# Patient Record
(demographics unavailable — no encounter records)

---

## 2024-10-25 NOTE — PHYSICAL EXAM
[No Acute Distress] : no acute distress [No Respiratory Distress] : no respiratory distress  [No Accessory Muscle Use] : no accessory muscle use [Clear to Auscultation] : lungs were clear to auscultation bilaterally [Normal Rate] : normal rate  [Regular Rhythm] : with a regular rhythm [Normal S1, S2] : normal S1 and S2 [No Carotid Bruits] : no carotid bruits [Soft] : abdomen soft [Non Tender] : non-tender [No Joint Swelling] : no joint swelling [No Rash] : no rash [Coordination Grossly Intact] : coordination grossly intact [Normal Affect] : the affect was normal [Normal Insight/Judgement] : insight and judgment were intact

## 2024-10-25 NOTE — HISTORY OF PRESENT ILLNESS
[FreeTextEntry1] : PReP follow up PHQ: 0 [de-identified] : 39 year old male presenting for apretude injection and to discuss a few issues:  -Peripheral vertigo - having sensation of room spinning for the past few months. Occurs with standing or with moving side to side. Sensation lasts a few minutes and goes away on its own. No inciting trigger initially. Grandmother has severe vertigo. -Headaches - worsening in the past few months. Happens about once every two weeks. Associated with photophobia and now occasionally nausea. Takes ibuprofen at the beginning of the pain to resolve it. If unable to take ibuprofen, then only sleep resolves it.  -Diarrhea after food from a [place] about one week ago. Resolving and now only one loose stool per day. No associated fever.  -MSM, sexually active, wants STD screening today

## 2024-10-25 NOTE — REVIEW OF SYSTEMS
[Diarrhea] : diarrhea [Headache] : headache [Fever] : no fever [Chest Pain] : no chest pain [Palpitations] : no palpitations [Claudication] : no  leg claudication [Shortness Of Breath] : no shortness of breath [Abdominal Pain] : no abdominal pain [Vomiting] : no vomiting [Dysuria] : no dysuria [de-identified] : Occasional vertigo

## 2024-12-13 NOTE — HEALTH RISK ASSESSMENT
[0] : 2) Feeling down, depressed, or hopeless: Not at all (0) [PHQ-2 Negative - No further assessment needed] : PHQ-2 Negative - No further assessment needed [[id number]] : 0

## 2024-12-13 NOTE — HISTORY OF PRESENT ILLNESS
[FreeTextEntry1] : PReP f/u PHQ2:0 [de-identified] : # PrEP - here for apretude, 7w0d since last visit - taking doxypep correctly prn - requests asymptomatic bacterial STI testing - mild pain at injection site but tolerable - no s/s acute HIV  # Vertigo - at last visit thought to have BPPV, referred to neuro given this and worsening headaches - got better since last visit - tends to happen after looking/bending down then on standing  - has neuro referral but hasn't had time to schedule yet  - headaches now resolved  - hasn't been drinking much water; used to not drink much at all and now improving  - 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

## 2024-12-13 NOTE — REVIEW OF SYSTEMS
[Fever] : no fever [Chills] : no chills [Recent Change In Weight] : ~T no recent weight change [Sore Throat] : no sore throat [Chest Pain] : no chest pain [Shortness Of Breath] : no shortness of breath [Cough] : no cough [Abdominal Pain] : no abdominal pain [Nausea] : no nausea [Constipation] : no constipation [Diarrhea] : no diarrhea [Vomiting] : no vomiting [Headache] : no headache [Fainting] : no fainting

## 2024-12-13 NOTE — PHYSICAL EXAM
[No Acute Distress] : no acute distress [Well-Appearing] : well-appearing [Normal Sclera/Conjunctiva] : normal sclera/conjunctiva [PERRL] : pupils equal round and reactive to light [EOMI] : extraocular movements intact [Normal Outer Ear/Nose] : the outer ears and nose were normal in appearance [Normal Oropharynx] : the oropharynx was normal [No Lymphadenopathy] : no lymphadenopathy [Supple] : supple [No Respiratory Distress] : no respiratory distress  [No Accessory Muscle Use] : no accessory muscle use [Clear to Auscultation] : lungs were clear to auscultation bilaterally [Normal Rate] : normal rate  [Regular Rhythm] : with a regular rhythm [Normal S1, S2] : normal S1 and S2 [No Murmur] : no murmur heard [Normal Posterior Cervical Nodes] : no posterior cervical lymphadenopathy [Normal Anterior Cervical Nodes] : no anterior cervical lymphadenopathy [No Focal Deficits] : no focal deficits [Normal Affect] : the affect was normal [Normal Insight/Judgement] : insight and judgment were intact [de-identified] : CN II-XII intact. [place] neg b/l

## 2025-02-10 NOTE — HISTORY OF PRESENT ILLNESS
[FreeTextEntry1] : Prep follow up PHQ-2(0PT) [de-identified] : # PrEP - here for apretude, 8w3d since last shot  - one new partner since last visit, oral only, used doxypep 2 pills right after  - no s/s acute HIV   # Lightheadedness - still periodic symptoms when turning head to either side  - has been improving PO hydration, symptoms no longer happening when standing - no syncope - BP checked periodically at work mostly c/w today's value   # Dental - got gum surgery in [place] since last visit  - issue was resolved, will be following up with them   # Acne - symptoms well controlled w topical creams  - using steroid cream sparingly on scalp  # Testicular Calcification - no symptoms - since last visit scheduled  f/u, will see them later this week

## 2025-02-10 NOTE — PHYSICAL EXAM
[No Acute Distress] : no acute distress [Well-Appearing] : well-appearing [Normal Outer Ear/Nose] : the outer ears and nose were normal in appearance [Normal Oropharynx] : the oropharynx was normal [No Lymphadenopathy] : no lymphadenopathy [Supple] : supple [No Respiratory Distress] : no respiratory distress  [No Accessory Muscle Use] : no accessory muscle use [Clear to Auscultation] : lungs were clear to auscultation bilaterally [Normal Rate] : normal rate  [Regular Rhythm] : with a regular rhythm [Normal S1, S2] : normal S1 and S2 [No Murmur] : no murmur heard [Soft] : abdomen soft [Non Tender] : non-tender [Non-distended] : non-distended [Normal Posterior Cervical Nodes] : no posterior cervical lymphadenopathy [Normal Anterior Cervical Nodes] : no anterior cervical lymphadenopathy [No Focal Deficits] : no focal deficits [Normal Affect] : the affect was normal [Normal Insight/Judgement] : insight and judgment were intact [de-identified] : lightheadedness not elicited w neck palpation

## 2025-02-10 NOTE — HEALTH RISK ASSESSMENT
[0] : 2) Feeling down, depressed, or hopeless: Not at all (0) [PHQ-2 Negative - No further assessment needed] : PHQ-2 Negative - No further assessment needed [Never] : Never [[id number]] : 0

## 2025-02-10 NOTE — REVIEW OF SYSTEMS
[Dizziness] : dizziness [Fever] : no fever [Chills] : no chills [Sore Throat] : no sore throat [Chest Pain] : no chest pain [Palpitations] : no palpitations [Lower Ext Edema] : no lower extremity edema [Shortness Of Breath] : no shortness of breath [Cough] : no cough [Abdominal Pain] : no abdominal pain [Nausea] : no nausea [Constipation] : no constipation [Diarrhea] : no diarrhea [Vomiting] : no vomiting [Melena] : no melena [Dysuria] : no dysuria [Hematuria] : no hematuria [Skin Rash] : no skin rash [Headache] : no headache [Fainting] : no fainting [FreeTextEntry4] : no swollen glands

## 2025-02-24 NOTE — HISTORY OF PRESENT ILLNESS
[de-identified] : 40 yo MARIAJOSE comes in for evaluation of left shoulder pain that started a month ago without injury.  His biggest complaint is the pain when dressing. He is ok with flexion but feels it more with abduction. Pain has been dull and mild, 1/10. He has not needed any pain medications.  No history of shoulder issues prior.  He typically does light weight workouts.  He has not ever had any injections or done physical therapy for the shoulder.  He does have a history of labral tear in the hip and pain is somewhat reminiscent of that

## 2025-02-24 NOTE — ASSESSMENT
[FreeTextEntry1] : 38 yo LHD male with left shoulder pain over the past month without any specific injury.  He has good motion and good strength but there is some pain.  He may have some bursitis or tendinopathy.  Labral tear certainly can occur but he did not have any recent or past trauma that he knows of.  Sometimes lifting weights they can occur. I recommended first trying some home exercises which she was given and physical therapy if needed.  He should take an anti-inflammatory and I suggested ibuprofen 600 mg twice a day with meals as long as that does not bother his stomach and to take it for couple weeks. If the pain does not resolve in the next 6 to 8 weeks then to come in for further evaluation and treatment.

## 2025-02-24 NOTE — PHYSICAL EXAM
[UE] : Sensory: Intact in bilateral upper extremities [Normal RUE] : Right Upper Extremity: No scars, rashes, lesions, ulcers, skin intact [Normal LUE] : Left Upper Extremity: No scars, rashes, lesions, ulcers, skin intact [Normal Touch] : sensation intact for touch [Normal] : Oriented to person, place, and time, insight and judgement were intact and the affect was normal [de-identified] : Left shoulder  No edema, ecchymoses, erythema, deformity. Left shoulder active range of motion is with 180 degrees forward elevation and internal rotation to T8 and 180 degrees abduction. Passively with the arm in 90 degrees abduction there is 90 degrees of external rotation and 45 degrees internal rotation. Mild pain with Neer and Roy.  Negative speeds and Hagerman's and apprehension. 5/5 supraspinatus, internal rotation, external rotation, biceps. No scapular winging. Normal neurovascular exam upper extremity [de-identified] :  X-rays ordered, performed and reviewed today of LEFT shoulder AP, Y lateral and axillary views for recent shoulder pain showed no calcifications, fractures, or arthritis.  Unremarkable

## 2025-02-24 NOTE — PHYSICAL EXAM
[UE] : Sensory: Intact in bilateral upper extremities [Normal RUE] : Right Upper Extremity: No scars, rashes, lesions, ulcers, skin intact [Normal LUE] : Left Upper Extremity: No scars, rashes, lesions, ulcers, skin intact [Normal Touch] : sensation intact for touch [Normal] : Oriented to person, place, and time, insight and judgement were intact and the affect was normal [de-identified] : Left shoulder  No edema, ecchymoses, erythema, deformity. Left shoulder active range of motion is with 180 degrees forward elevation and internal rotation to T8 and 180 degrees abduction. Passively with the arm in 90 degrees abduction there is 90 degrees of external rotation and 45 degrees internal rotation. Mild pain with Neer and Roy.  Negative speeds and [place]ll's and apprehension. 5/5 supraspinatus, internal rotation, external rotation, biceps. No scapular winging. Normal neurovascular exam upper extremity [de-identified] :  X-rays ordered, performed and reviewed today of LEFT shoulder AP, Y lateral and axillary views for recent shoulder pain showed no calcifications, fractures, or arthritis.  Unremarkable

## 2025-05-23 NOTE — HISTORY OF PRESENT ILLNESS
[FreeTextEntry1] : ANGIE BETHEA is a 39 year M presenting on 05/23/2025 PMH: HLD, acne, renal stones,  epididymitis 2007 last seen 2022  Was following doctor annually to do serial sonos of epididymal cysts Told that he does not have to continue but wants to eval once more before insurance change Cyst first discovered 2006 on self exam No pain. Occasional sensitivity. No urinary bother. No hematuria No ED concerns  Social hx: denies smoking or alcohol Fhx: denies prostate or bladder cancer   Scrotal Ultrasound: 9/27/2022 1 cm complex cystic lesion int he right epididymal head 0.6 cm simple cystic lesion in the left epididymal head could be a spermatocele Tiny calcification left epididymal tail

## 2025-05-28 NOTE — HISTORY OF PRESENT ILLNESS
[FreeTextEntry1] : PReP f/u  [de-identified] : # PrEP - here for apretude, 7w5d since last shot - feels well, no health concerns - starting nursing school next week- will have gap in insurance coverage but planning to get marketplace plan  # Sexual Health  - requesting doxypep refill, using correctly  - no acute STI symptoms  # Dizziness - improving, only triggered by dehydration now, rare  # Testicular Calcifications - saw , going for US later this week

## 2025-05-28 NOTE — REVIEW OF SYSTEMS
[Fever] : no fever [Chills] : no chills [Sore Throat] : no sore throat [Chest Pain] : no chest pain [Palpitations] : no palpitations [Shortness Of Breath] : no shortness of breath [Cough] : no cough [Abdominal Pain] : no abdominal pain [Nausea] : no nausea [Constipation] : no constipation [Diarrhea] : no diarrhea [Vomiting] : no vomiting [Melena] : no melena [Dysuria] : no dysuria [Hematuria] : no hematuria [Headache] : no headache [Fainting] : no fainting [Swollen Glands] : no swollen glands [FreeTextEntry8] : no genital rash/discharge

## 2025-05-31 NOTE — ASSESSMENT
[FreeTextEntry1] : epididymal cyst stable no clear indicantion for surveillance consider reepat 2 years per patient preference

## 2025-07-25 NOTE — REVIEW OF SYSTEMS
[Fever] : no fever [Chest Pain] : no chest pain [Palpitations] : no palpitations [Lower Ext Edema] : no lower extremity edema [Shortness Of Breath] : no shortness of breath [Wheezing] : no wheezing [Dyspnea on Exertion] : not dyspnea on exertion [Abdominal Pain] : no abdominal pain [Nausea] : no nausea [Constipation] : no constipation [Diarrhea] : no diarrhea [Vomiting] : no vomiting [Dysuria] : no dysuria [Joint Pain] : no joint pain

## 2025-07-25 NOTE — HISTORY OF PRESENT ILLNESS
[FreeTextEntry1] : Prep Follow up [de-identified] : 40 year old male, MSM on PrEP presenting to switch from apretude to yeztugo. 1st injection and oral administration of Yeztugo today and STI screen. Pt doing well, no complaints.